# Patient Record
Sex: MALE | Race: BLACK OR AFRICAN AMERICAN | URBAN - METROPOLITAN AREA
[De-identification: names, ages, dates, MRNs, and addresses within clinical notes are randomized per-mention and may not be internally consistent; named-entity substitution may affect disease eponyms.]

---

## 2021-11-30 ENCOUNTER — WEB ENCOUNTER (OUTPATIENT)
Dept: URBAN - METROPOLITAN AREA CLINIC 105 | Facility: CLINIC | Age: 51
End: 2021-11-30

## 2021-12-02 ENCOUNTER — OFFICE VISIT (OUTPATIENT)
Dept: URBAN - METROPOLITAN AREA CLINIC 105 | Facility: CLINIC | Age: 51
End: 2021-12-02
Payer: COMMERCIAL

## 2021-12-02 ENCOUNTER — WEB ENCOUNTER (OUTPATIENT)
Dept: URBAN - METROPOLITAN AREA CLINIC 105 | Facility: CLINIC | Age: 51
End: 2021-12-02

## 2021-12-02 ENCOUNTER — DASHBOARD ENCOUNTERS (OUTPATIENT)
Age: 51
End: 2021-12-02

## 2021-12-02 DIAGNOSIS — U07.1 COVID: ICD-10-CM

## 2021-12-02 DIAGNOSIS — Z12.11 SCREEN FOR COLON CANCER: ICD-10-CM

## 2021-12-02 DIAGNOSIS — K52.9 ACUTE COLITIS: ICD-10-CM

## 2021-12-02 DIAGNOSIS — E55.9 VITAMIN D DEFICIENCY DISEASE: ICD-10-CM

## 2021-12-02 PROBLEM — 34713006: Status: ACTIVE | Noted: 2021-12-02

## 2021-12-02 PROCEDURE — 99244 OFF/OP CNSLTJ NEW/EST MOD 40: CPT | Performed by: INTERNAL MEDICINE

## 2021-12-02 PROCEDURE — 99204 OFFICE O/P NEW MOD 45 MIN: CPT | Performed by: INTERNAL MEDICINE

## 2021-12-02 NOTE — HPI-TODAY'S VISIT:
The patient has a history of acute pancolitis who presents for GI evaluation. The pt was diagnosed with COVID in 10/2021 and presented to [place] [facility] with acute diarrhea and CT revaled acute pancoltisi. The pt notes that he has since tested negative for COVID and the diarrhea has resolved. He notes that he and his daughter were infected with COVI D and other family members have been negative. He is due for a  screening colon.  The patient's consultation note will be sent to his referring MD. Dr. louis Obrien.

## 2022-03-08 ENCOUNTER — OFFICE VISIT (OUTPATIENT)
Dept: URBAN - METROPOLITAN AREA SURGERY CENTER 16 | Facility: SURGERY CENTER | Age: 52
End: 2022-03-08

## 2022-03-08 ENCOUNTER — CLAIMS CREATED FROM THE CLAIM WINDOW (OUTPATIENT)
Dept: URBAN - METROPOLITAN AREA SURGERY CENTER 16 | Facility: SURGERY CENTER | Age: 52
End: 2022-03-08
Payer: COMMERCIAL

## 2022-03-08 DIAGNOSIS — K63.89 BACTERIAL OVERGROWTH SYNDROME: ICD-10-CM

## 2022-03-08 DIAGNOSIS — K52.89 (LYMPHOCYTIC) MICROSCOPIC COLITIS: ICD-10-CM

## 2022-03-08 PROCEDURE — G8907 PT DOC NO EVENTS ON DISCHARG: HCPCS | Performed by: INTERNAL MEDICINE

## 2022-03-08 PROCEDURE — 45380 COLONOSCOPY AND BIOPSY: CPT | Performed by: INTERNAL MEDICINE

## 2022-03-09 ENCOUNTER — TELEPHONE ENCOUNTER (OUTPATIENT)
Dept: URBAN - METROPOLITAN AREA CLINIC 92 | Facility: CLINIC | Age: 52
End: 2022-03-09

## 2022-03-16 LAB
A/G RATIO: 1.4
ACCA: 57
ALBUMIN: 4.5
ALCA: 5
ALKALINE PHOSPHATASE: 81
ALT (SGPT): 17
AMCA: 70
AST (SGOT): 21
ATYPICAL PANCA: NEGATIVE
BASO (ABSOLUTE): 0
BASOS: 1
BILIRUBIN, TOTAL: 0.3
BUN/CREATININE RATIO: 10
BUN: 12
C-REACTIVE PROTEIN, QUANT: 9
CALCIUM: 9.4
CARBON DIOXIDE, TOTAL: 21
CHLORIDE: 101
COMMENTS: (no result)
CREATININE: 1.16
EGFR: 76
EOS (ABSOLUTE): 0.1
EOS: 1
GASCA: 25
GLOBULIN, TOTAL: 3.2
GLUCOSE: 89
HBSAG SCREEN: NEGATIVE
HEMATOCRIT: 42.2
HEMATOLOGY COMMENTS:: (no result)
HEMOGLOBIN: 13.5
IMMATURE CELLS: (no result)
IMMATURE GRANS (ABS): 0
IMMATURE GRANULOCYTES: 0
LYMPHS (ABSOLUTE): 1.7
LYMPHS: 34
MCH: 26.5
MCHC: 32
MCV: 83
MONOCYTES(ABSOLUTE): 0.3
MONOCYTES: 6
NEUTROPHILS (ABSOLUTE): 2.9
NEUTROPHILS: 58
NRBC: (no result)
PLATELETS: 234
POTASSIUM: 4.4
PROTEIN, TOTAL: 7.7
QUANTIFERON CRITERIA: (no result)
QUANTIFERON INCUBATION: (no result)
QUANTIFERON MITOGEN VALUE: >10
QUANTIFERON NIL VALUE: 0.02
QUANTIFERON TB1 AG VALUE: 0.02
QUANTIFERON TB2 AG VALUE: 0.02
QUANTIFERON-TB GOLD PLUS: NEGATIVE
RBC: 5.09
RDW: 13.1
SODIUM: 139
WBC: 5